# Patient Record
Sex: MALE | Race: WHITE | Employment: FULL TIME | URBAN - METROPOLITAN AREA
[De-identification: names, ages, dates, MRNs, and addresses within clinical notes are randomized per-mention and may not be internally consistent; named-entity substitution may affect disease eponyms.]

---

## 2022-12-13 ENCOUNTER — OFFICE VISIT (OUTPATIENT)
Dept: PRIMARY CARE CLINIC | Age: 32
End: 2022-12-13

## 2022-12-13 VITALS
DIASTOLIC BLOOD PRESSURE: 75 MMHG | TEMPERATURE: 97.2 F | HEART RATE: 83 BPM | BODY MASS INDEX: 20.58 KG/M2 | WEIGHT: 147 LBS | SYSTOLIC BLOOD PRESSURE: 115 MMHG | HEIGHT: 71 IN

## 2022-12-13 DIAGNOSIS — R05.8 PRODUCTIVE COUGH: ICD-10-CM

## 2022-12-13 DIAGNOSIS — J40 BRONCHITIS: Primary | ICD-10-CM

## 2022-12-13 PROCEDURE — 99213 OFFICE O/P EST LOW 20 MIN: CPT | Performed by: NURSE PRACTITIONER

## 2022-12-13 RX ORDER — AMOXICILLIN 500 MG/1
CAPSULE ORAL
Qty: 40 CAPSULE | Refills: 0 | Status: SHIPPED | OUTPATIENT
Start: 2022-12-13

## 2022-12-13 RX ORDER — PREDNISONE 10 MG/1
10 TABLET ORAL 2 TIMES DAILY
Qty: 10 TABLET | Refills: 0 | Status: SHIPPED | OUTPATIENT
Start: 2022-12-13 | End: 2022-12-18

## 2022-12-13 NOTE — PROGRESS NOTES
Chief Complaint:   Cough, Congestion, and Pharyngitis (Had the flu last week. )      History of Present Illness   Source of history provided by:  patientEduardo Brown is a 33 y.o. old male with a past medical history of: No past medical history on file. Pt presents to the [facility] care with a cough/congestion/sore throat for the past 5-7 days. States the cough is  productive with yellow sputum. No  fever noted. Denies any N/V/D, abdominal pain, CP, progressive SOB, dizziness, or lethargy. Pt was dx with Influenza 7 days ago. Pt does not currently have healthcare coverage      ROS    Unless otherwise stated in this report or unable to obtain because of the patient's clinical or mental status as evidenced by the medical record, this patients's positive and negative responses for Review of Systems, constitutional, psych, eyes, ENT, cardiovascular, respiratory, gastrointestinal, neurological, genitourinary, musculoskeletal, integument systems and systems related to the presenting problem are either stated in the preceding or were not pertinent or were negative for the symptoms and/or complaints related to the medical problem. Past Surgical History:  has no past surgical history on file. Social History:    Family History: family history is not on file. Allergies: Patient has no allergy information on record. Physical Exam         VS:  /75   Pulse 83   Temp 97.2 °F (36.2 °C) (Temporal)   Ht 5' 11\" (1.803 m)   Wt 147 lb (66.7 kg)   BMI 20.50 kg/m²    Oxygen Saturation Interpretation: Normal.    Constitutional:  Alert, development consistent with age. Ears:  External Ears: Normal bilateral pinna. TM's & External Canals: TM's normal bilaterally without perforation. Canals without erythema or drainage. Nose:   There is no obvious septal defect. Mouth:  Moist bucca mucosa and normal tongue. Throat: Mild posterior pharyngeal erythema without exudates or lesions. Neck:  Supple. There is no obvious adenopathy or neck tenderness. Lungs:   Breath sounds: Normal chest expansion and breath sounds: mild wheezing present  Heart:  Regular rate and rhythm, normal heart sounds, without pathological murmurs, ectopy, gallops, or rubs. Skin:  Normal turgor. Warm, dry, without visible rash. Neurological:  Oriented. Motor functions intact. Lab / Imaging Results   (All laboratory and radiology results have been personally reviewed by myself)  Labs:  No results found for this visit on 12/13/22. Imaging: All Radiology results interpreted by Radiologist unless otherwise noted. No orders to display         Assessment / Plan     Impression(s):  1. Bronchitis    2. Productive cough      Disposition:  Disposition: Advised to start Amoxicillin and Prednisone as ordered. Stay well hydrated, provided pt w/Good Rx vouchers to obtain medications.  Return to walk in care w/any worsening or concerning medical issues